# Patient Record
Sex: FEMALE | Race: WHITE | NOT HISPANIC OR LATINO | Employment: FULL TIME | URBAN - METROPOLITAN AREA
[De-identification: names, ages, dates, MRNs, and addresses within clinical notes are randomized per-mention and may not be internally consistent; named-entity substitution may affect disease eponyms.]

---

## 2023-02-07 PROBLEM — D75.1 ERYTHROCYTOSIS: Status: ACTIVE | Noted: 2023-02-07

## 2023-02-07 PROBLEM — F90.0 ADHD, PREDOMINANTLY INATTENTIVE TYPE: Status: ACTIVE | Noted: 2023-02-07

## 2023-02-07 PROBLEM — E55.9 VITAMIN D DEFICIENCY: Status: ACTIVE | Noted: 2023-02-07

## 2023-02-07 PROBLEM — G89.29 CHRONIC PAIN IN LEFT FOOT: Status: ACTIVE | Noted: 2023-02-07

## 2023-02-07 PROBLEM — M79.18 CHRONIC MUSCULOSKELETAL PAIN: Status: ACTIVE | Noted: 2023-02-07

## 2023-02-07 PROBLEM — Z97.5 PRESENCE OF IUD: Status: ACTIVE | Noted: 2023-02-07

## 2023-02-07 PROBLEM — K21.9 GERD (GASTROESOPHAGEAL REFLUX DISEASE): Status: ACTIVE | Noted: 2023-02-07

## 2023-02-07 PROBLEM — M79.672 PAIN IN BOTH FEET: Status: ACTIVE | Noted: 2023-02-07

## 2023-02-07 PROBLEM — N29 NEPHROCALCINOSIS: Status: ACTIVE | Noted: 2023-02-07

## 2023-02-07 PROBLEM — M54.2 CERVICAL PAIN: Status: ACTIVE | Noted: 2023-02-07

## 2023-02-07 PROBLEM — F41.9 ANXIETY: Status: ACTIVE | Noted: 2023-02-07

## 2023-02-07 PROBLEM — G43.909 MIGRAINES: Status: ACTIVE | Noted: 2023-02-07

## 2023-02-07 PROBLEM — K59.04 CHRONIC IDIOPATHIC CONSTIPATION: Status: ACTIVE | Noted: 2023-02-07

## 2023-02-07 PROBLEM — J30.9 ALLERGIC RHINITIS: Status: ACTIVE | Noted: 2023-02-07

## 2023-02-07 PROBLEM — R10.13 CHRONIC EPIGASTRIC PAIN: Status: ACTIVE | Noted: 2023-02-07

## 2023-02-07 PROBLEM — M79.2 NEURALGIA OF LEFT UPPER EXTREMITY: Status: ACTIVE | Noted: 2023-02-07

## 2023-02-07 PROBLEM — M25.572 CHRONIC PAIN OF LEFT ANKLE: Status: ACTIVE | Noted: 2023-02-07

## 2023-02-07 PROBLEM — R30.0 DYSURIA: Status: ACTIVE | Noted: 2023-02-07

## 2023-02-07 PROBLEM — M79.672 CHRONIC PAIN IN LEFT FOOT: Status: ACTIVE | Noted: 2023-02-07

## 2023-02-07 PROBLEM — F32.1 MODERATE MAJOR DEPRESSION (MULTI): Status: ACTIVE | Noted: 2023-02-07

## 2023-02-07 PROBLEM — M79.671 PAIN IN BOTH FEET: Status: ACTIVE | Noted: 2023-02-07

## 2023-02-07 PROBLEM — K58.9 IBS (IRRITABLE BOWEL SYNDROME): Status: ACTIVE | Noted: 2023-02-07

## 2023-02-07 PROBLEM — K76.0 FATTY LIVER: Status: ACTIVE | Noted: 2023-02-07

## 2023-02-07 PROBLEM — J06.9 URI (UPPER RESPIRATORY INFECTION): Status: ACTIVE | Noted: 2023-02-07

## 2023-02-07 PROBLEM — G57.92 DISORDER OF PERIPHERAL NERVE OF LEFT LOWER EXTREMITY: Status: ACTIVE | Noted: 2023-02-07

## 2023-02-07 PROBLEM — G89.29 CHRONIC PAIN OF LEFT ANKLE: Status: ACTIVE | Noted: 2023-02-07

## 2023-02-07 PROBLEM — G89.29 CHRONIC EPIGASTRIC PAIN: Status: ACTIVE | Noted: 2023-02-07

## 2023-02-07 PROBLEM — E83.59 NEPHROCALCINOSIS: Status: ACTIVE | Noted: 2023-02-07

## 2023-02-07 PROBLEM — G89.29 CHRONIC MUSCULOSKELETAL PAIN: Status: ACTIVE | Noted: 2023-02-07

## 2023-02-23 LAB
RPR MONITORING: NORMAL
VDRL TITER: ABNORMAL
VDRL: REACTIVE

## 2023-03-06 ENCOUNTER — TELEPHONE (OUTPATIENT)
Dept: PRIMARY CARE | Facility: CLINIC | Age: 48
End: 2023-03-06
Payer: COMMERCIAL

## 2023-03-06 NOTE — TELEPHONE ENCOUNTER
Pt left message that dr tristin witt extend her fmla leave. Since everything checks out ok with him, he ref her to a oral surgeon. She is supposed to rtw tomorrow and she is still in pain and unable to use a head set. Asking if you can extend her leave until she gets to see surgeon

## 2023-03-07 NOTE — TELEPHONE ENCOUNTER
Patient called back stated that appointment with oral surgeon had to be rescheduled to April 20. [facility] can get in anyplace sooner

## 2023-03-14 NOTE — TELEPHONE ENCOUNTER
Pt left message that sara darnell oral surgeon the soonest she could be seen is 3/21/23 and gloria needs ov notes and a letter of estimated RTW date.  Fax to [phone]

## 2023-04-02 PROBLEM — N75.0 BARTHOLIN'S GLAND CYST: Status: ACTIVE | Noted: 2023-04-02

## 2023-04-02 PROBLEM — J11.1 INFLUENZA: Status: ACTIVE | Noted: 2023-04-02

## 2023-04-02 PROBLEM — B19.10 HEPATITIS B: Status: ACTIVE | Noted: 2023-04-02

## 2023-04-02 PROBLEM — E66.3 OVERWEIGHT WITH BODY MASS INDEX (BMI) OF 29 TO 29.9 IN ADULT: Status: ACTIVE | Noted: 2023-04-02

## 2023-04-02 PROBLEM — H66.91 RIGHT OTITIS MEDIA: Status: ACTIVE | Noted: 2023-04-02

## 2023-04-02 PROBLEM — A53.9 SYPHILIS: Status: ACTIVE | Noted: 2023-04-02

## 2023-04-02 RX ORDER — CYCLOBENZAPRINE HCL 10 MG
10 TABLET ORAL 3 TIMES DAILY PRN
COMMUNITY
End: 2023-10-23 | Stop reason: SDUPTHER

## 2023-04-02 RX ORDER — ACETAMINOPHEN 500 MG
1 TABLET ORAL DAILY
COMMUNITY

## 2023-04-17 LAB
ALANINE AMINOTRANSFERASE (SGPT) (U/L) IN SER/PLAS: 19 U/L (ref 7–45)
ALBUMIN (G/DL) IN SER/PLAS: 4 G/DL (ref 3.4–5)
ALKALINE PHOSPHATASE (U/L) IN SER/PLAS: 63 U/L (ref 33–110)
ANION GAP IN SER/PLAS: 10 MMOL/L (ref 10–20)
ASPARTATE AMINOTRANSFERASE (SGOT) (U/L) IN SER/PLAS: 14 U/L (ref 9–39)
BILIRUBIN TOTAL (MG/DL) IN SER/PLAS: 0.3 MG/DL (ref 0–1.2)
CALCIUM (MG/DL) IN SER/PLAS: 8.6 MG/DL (ref 8.6–10.3)
CARBON DIOXIDE, TOTAL (MMOL/L) IN SER/PLAS: 25 MMOL/L (ref 21–32)
CHLORIDE (MMOL/L) IN SER/PLAS: 107 MMOL/L (ref 98–107)
CREATININE (MG/DL) IN SER/PLAS: 0.74 MG/DL (ref 0.5–1.05)
ERYTHROCYTE DISTRIBUTION WIDTH (RATIO) BY AUTOMATED COUNT: 14.5 % (ref 11.5–14.5)
ERYTHROCYTE MEAN CORPUSCULAR HEMOGLOBIN CONCENTRATION (G/DL) BY AUTOMATED: 32.5 G/DL (ref 32–36)
ERYTHROCYTE MEAN CORPUSCULAR VOLUME (FL) BY AUTOMATED COUNT: 92 FL (ref 80–100)
ERYTHROCYTES (10*6/UL) IN BLOOD BY AUTOMATED COUNT: 4.84 X10E12/L (ref 4–5.2)
GFR FEMALE: >90 ML/MIN/1.73M2
GLUCOSE (MG/DL) IN SER/PLAS: 88 MG/DL (ref 74–99)
HEMATOCRIT (%) IN BLOOD BY AUTOMATED COUNT: 44.3 % (ref 36–46)
HEMOGLOBIN (G/DL) IN BLOOD: 14.4 G/DL (ref 12–16)
HEPATITIS A VIRUS IGM AB PRESENCE IN SER/PLAS BY IMMUNOASSAY: NONREACTIVE
HEPATITIS B VIRUS CORE AB (PRESENCE) IN SER/PLAS BY IMM: NONREACTIVE
HEPATITIS B VIRUS CORE IGM AB PRESENCE IN SER/PLAS BY IMMUNOASSY: NONREACTIVE
HEPATITIS B VIRUS SURFACE AG PRESENCE IN SERUM: NONREACTIVE
HEPATITIS C VIRUS AB PRESENCE IN SERUM: NONREACTIVE
LEUKOCYTES (10*3/UL) IN BLOOD BY AUTOMATED COUNT: 7.3 X10E9/L (ref 4.4–11.3)
MISCELLANEUOUS TEST RESULT: NORMAL
NAME OF SENDOUT TEST: NORMAL
PLATELETS (10*3/UL) IN BLOOD AUTOMATED COUNT: 273 X10E9/L (ref 150–450)
POTASSIUM (MMOL/L) IN SER/PLAS: 4 MMOL/L (ref 3.5–5.3)
PROTEIN TOTAL: 7.3 G/DL (ref 6.4–8.2)
SODIUM (MMOL/L) IN SER/PLAS: 138 MMOL/L (ref 136–145)
UREA NITROGEN (MG/DL) IN SER/PLAS: 16 MG/DL (ref 6–23)

## 2023-04-20 LAB
HBV DNA PCR COMMENT: NORMAL
HBV DNA QUANT PCR IU/ML: NOT DETECTED IU/ML
HBV DNA QUANT PCR LOG: NORMAL LOG IU/ML

## 2023-04-21 LAB
RPR MONITORING: NORMAL
VDRL TITER: ABNORMAL
VDRL: ABNORMAL

## 2023-05-18 ENCOUNTER — TELEPHONE (OUTPATIENT)
Dept: PRIMARY CARE | Facility: CLINIC | Age: 48
End: 2023-05-18
Payer: COMMERCIAL
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
## 2023-06-12 NOTE — TELEPHONE ENCOUNTER
I cannot fill out her form without seeing her first. Please try to call again. If you cannot get a hold of her, please send a letter.

## 2023-06-12 NOTE — TELEPHONE ENCOUNTER
Patient's phone is still not connecting for me. I mailed her a letter asking to call if this is still needed. Also made copy of letter for chart

## 2023-09-22 ENCOUNTER — TELEPHONE (OUTPATIENT)
Dept: PRIMARY CARE | Facility: CLINIC | Age: 48
End: 2023-09-22
Payer: COMMERCIAL

## 2023-09-22 NOTE — TELEPHONE ENCOUNTER
I am not sure what she is referring to. The only thing I see is that in May she called asking for a letter to allow her to work from home and I stated she needed an appointment first. Looks like [place] tried to schedule the patient for an appointment but was unable to contact her, so she ended up sending her a letter to call the office and schedule. This was in [place].

## 2023-09-22 NOTE — TELEPHONE ENCOUNTER
Patient was wanting to know if you could put a rush on her records for life insurance??    Also need refill on flexeril

## 2023-09-26 ENCOUNTER — TELEPHONE (OUTPATIENT)
Dept: PRIMARY CARE | Facility: CLINIC | Age: 48
End: 2023-09-26
Payer: COMMERCIAL

## 2023-09-26 NOTE — TELEPHONE ENCOUNTER
1) Please let her know that I can write a letter verifying that she has had the hepatitis B vaccines but that she will need to get a letter from infectious disease (Dr. Burroughs) clearing her from the syphillis standpoint.    2) Please write a letter stating that she is up to date on hepatitis B vaccines, having had them done 2/22/23, 4/13/23, and 9/6/23

## 2023-09-26 NOTE — TELEPHONE ENCOUNTER
I spoke to the patient about this and what she needs is a letter stating that she completed her Hep B series of vaccines and that testing for syphillis came back clear. She will need it for life insurance. I can write it if you need I am just not completely sure how

## 2023-09-26 NOTE — TELEPHONE ENCOUNTER
Sent email to patient per her request with letter regarding Hep B vaccines. She was advised to contact Dr Burroughs for syphilis letter

## 2023-10-23 DIAGNOSIS — M79.18 CHRONIC MUSCULOSKELETAL PAIN: Primary | ICD-10-CM

## 2023-10-23 DIAGNOSIS — G89.29 CHRONIC MUSCULOSKELETAL PAIN: Primary | ICD-10-CM

## 2023-10-23 RX ORDER — CYCLOBENZAPRINE HCL 10 MG
10 TABLET ORAL 3 TIMES DAILY PRN
Qty: 90 TABLET | Refills: 1 | Status: SHIPPED | OUTPATIENT
Start: 2023-10-23 | End: 2024-01-21

## 2023-11-02 DIAGNOSIS — M79.18 CHRONIC MUSCULOSKELETAL PAIN: ICD-10-CM

## 2023-11-02 DIAGNOSIS — G89.29 CHRONIC MUSCULOSKELETAL PAIN: ICD-10-CM

## 2023-11-02 RX ORDER — CYCLOBENZAPRINE HCL 10 MG
10 TABLET ORAL 3 TIMES DAILY PRN
Qty: 90 TABLET | Refills: 1 | OUTPATIENT
Start: 2023-11-02 | End: 2024-01-31

## 2024-03-05 ENCOUNTER — TELEPHONE (OUTPATIENT)
Dept: PRIMARY CARE | Facility: CLINIC | Age: 49
End: 2024-03-05
Payer: MEDICARE

## 2024-03-05 DIAGNOSIS — G89.29 CHRONIC MUSCULOSKELETAL PAIN: ICD-10-CM

## 2024-03-05 DIAGNOSIS — M79.18 CHRONIC MUSCULOSKELETAL PAIN: ICD-10-CM

## 2024-03-05 RX ORDER — CYCLOBENZAPRINE HCL 10 MG
10 TABLET ORAL 3 TIMES DAILY PRN
Qty: 90 TABLET | Refills: 1 | Status: CANCELLED | OUTPATIENT
Start: 2024-03-05 | End: 2024-06-03

## 2024-06-11 ENCOUNTER — APPOINTMENT (OUTPATIENT)
Dept: PRIMARY CARE | Facility: CLINIC | Age: 49
End: 2024-06-11
Payer: MEDICARE

## 2024-06-11 ENCOUNTER — TELEPHONE (OUTPATIENT)
Dept: PRIMARY CARE | Facility: CLINIC | Age: 49
End: 2024-06-11

## 2024-06-11 DIAGNOSIS — G89.29 CHRONIC MUSCULOSKELETAL PAIN: ICD-10-CM

## 2024-06-11 DIAGNOSIS — M79.18 CHRONIC MUSCULOSKELETAL PAIN: ICD-10-CM

## 2024-06-11 DIAGNOSIS — B07.9 WARTS OF FOOT: Primary | ICD-10-CM

## 2024-06-11 RX ORDER — CYCLOBENZAPRINE HCL 10 MG
10 TABLET ORAL 3 TIMES DAILY PRN
Qty: 90 TABLET | Refills: 1 | Status: SHIPPED | OUTPATIENT
Start: 2024-06-11 | End: 2024-09-09

## 2024-06-11 NOTE — TELEPHONE ENCOUNTER
Patient had to reschedule appt, but wanted to know if she can get refill on muscle relaxer and wanted podiatry referral due to warts on feet

## 2024-09-03 ENCOUNTER — APPOINTMENT (OUTPATIENT)
Dept: PODIATRY | Facility: CLINIC | Age: 49
End: 2024-09-03
Payer: MEDICARE

## 2024-09-17 ENCOUNTER — APPOINTMENT (OUTPATIENT)
Dept: PRIMARY CARE | Facility: CLINIC | Age: 49
End: 2024-09-17
Payer: MEDICARE

## 2025-01-16 ENCOUNTER — OFFICE VISIT (OUTPATIENT)
Dept: PRIMARY CARE | Facility: CLINIC | Age: 50
End: 2025-01-16
Payer: MEDICAID

## 2025-01-16 VITALS
RESPIRATION RATE: 16 BRPM | HEART RATE: 66 BPM | WEIGHT: 191 LBS | SYSTOLIC BLOOD PRESSURE: 114 MMHG | BODY MASS INDEX: 31.82 KG/M2 | DIASTOLIC BLOOD PRESSURE: 77 MMHG | HEIGHT: 65 IN | OXYGEN SATURATION: 98 %

## 2025-01-16 DIAGNOSIS — G89.29 CHRONIC PAIN IN LEFT FOOT: ICD-10-CM

## 2025-01-16 DIAGNOSIS — R20.0 NUMBNESS AND TINGLING OF BOTH FEET: ICD-10-CM

## 2025-01-16 DIAGNOSIS — R20.0 NUMBNESS OF FINGERS OF BOTH HANDS: ICD-10-CM

## 2025-01-16 DIAGNOSIS — F90.0 ADHD, PREDOMINANTLY INATTENTIVE TYPE: ICD-10-CM

## 2025-01-16 DIAGNOSIS — B19.10 HEPATITIS B INFECTION WITHOUT DELTA AGENT WITHOUT HEPATIC COMA, UNSPECIFIED CHRONICITY: ICD-10-CM

## 2025-01-16 DIAGNOSIS — E66.811 CLASS 1 OBESITY DUE TO EXCESS CALORIES WITHOUT SERIOUS COMORBIDITY WITH BODY MASS INDEX (BMI) OF 31.0 TO 31.9 IN ADULT: ICD-10-CM

## 2025-01-16 DIAGNOSIS — A53.9 SYPHILIS: ICD-10-CM

## 2025-01-16 DIAGNOSIS — M79.18 CHRONIC MUSCULOSKELETAL PAIN: ICD-10-CM

## 2025-01-16 DIAGNOSIS — G89.29 CHRONIC MUSCULOSKELETAL PAIN: ICD-10-CM

## 2025-01-16 DIAGNOSIS — M79.672 CHRONIC PAIN IN LEFT FOOT: ICD-10-CM

## 2025-01-16 DIAGNOSIS — K76.0 FATTY LIVER: ICD-10-CM

## 2025-01-16 DIAGNOSIS — G89.29 CHRONIC PAIN OF LEFT ANKLE: ICD-10-CM

## 2025-01-16 DIAGNOSIS — M25.572 CHRONIC PAIN OF LEFT ANKLE: ICD-10-CM

## 2025-01-16 DIAGNOSIS — R20.2 NUMBNESS AND TINGLING OF BOTH FEET: ICD-10-CM

## 2025-01-16 DIAGNOSIS — Z13.220 LIPID SCREENING: ICD-10-CM

## 2025-01-16 DIAGNOSIS — R07.9 CHEST PAIN, UNSPECIFIED TYPE: Primary | ICD-10-CM

## 2025-01-16 DIAGNOSIS — Z82.49 FAMILY HISTORY OF HEART DISEASE: ICD-10-CM

## 2025-01-16 DIAGNOSIS — E66.09 CLASS 1 OBESITY DUE TO EXCESS CALORIES WITHOUT SERIOUS COMORBIDITY WITH BODY MASS INDEX (BMI) OF 31.0 TO 31.9 IN ADULT: ICD-10-CM

## 2025-01-16 PROBLEM — B07.9 WARTS OF FOOT: Status: RESOLVED | Noted: 2024-06-11 | Resolved: 2025-01-16

## 2025-01-16 PROBLEM — N29 NEPHROCALCINOSIS: Status: RESOLVED | Noted: 2023-02-07 | Resolved: 2025-01-16

## 2025-01-16 PROBLEM — J06.9 URI (UPPER RESPIRATORY INFECTION): Status: RESOLVED | Noted: 2023-02-07 | Resolved: 2025-01-16

## 2025-01-16 PROBLEM — F32.1 MODERATE MAJOR DEPRESSION (MULTI): Status: RESOLVED | Noted: 2023-02-07 | Resolved: 2025-01-16

## 2025-01-16 PROBLEM — G57.92 DISORDER OF PERIPHERAL NERVE OF LEFT LOWER EXTREMITY: Status: RESOLVED | Noted: 2023-02-07 | Resolved: 2025-01-16

## 2025-01-16 PROBLEM — M79.2 NEURALGIA OF LEFT UPPER EXTREMITY: Status: RESOLVED | Noted: 2023-02-07 | Resolved: 2025-01-16

## 2025-01-16 PROBLEM — K21.9 GERD (GASTROESOPHAGEAL REFLUX DISEASE): Status: RESOLVED | Noted: 2023-02-07 | Resolved: 2025-01-16

## 2025-01-16 PROBLEM — E83.59 NEPHROCALCINOSIS: Status: RESOLVED | Noted: 2023-02-07 | Resolved: 2025-01-16

## 2025-01-16 PROBLEM — J11.1 INFLUENZA: Status: RESOLVED | Noted: 2023-04-02 | Resolved: 2025-01-16

## 2025-01-16 PROBLEM — R30.0 DYSURIA: Status: RESOLVED | Noted: 2023-02-07 | Resolved: 2025-01-16

## 2025-01-16 PROBLEM — Z97.5 PRESENCE OF IUD: Status: RESOLVED | Noted: 2023-02-07 | Resolved: 2025-01-16

## 2025-01-16 PROBLEM — N75.0 BARTHOLIN'S GLAND CYST: Status: RESOLVED | Noted: 2023-04-02 | Resolved: 2025-01-16

## 2025-01-16 PROBLEM — D75.1 ERYTHROCYTOSIS: Status: RESOLVED | Noted: 2023-02-07 | Resolved: 2025-01-16

## 2025-01-16 PROBLEM — M79.671 PAIN IN BOTH FEET: Status: RESOLVED | Noted: 2023-02-07 | Resolved: 2025-01-16

## 2025-01-16 PROBLEM — F41.9 ANXIETY: Status: RESOLVED | Noted: 2023-02-07 | Resolved: 2025-01-16

## 2025-01-16 PROBLEM — H66.91 RIGHT OTITIS MEDIA: Status: RESOLVED | Noted: 2023-04-02 | Resolved: 2025-01-16

## 2025-01-16 PROCEDURE — 93000 ELECTROCARDIOGRAM COMPLETE: CPT | Performed by: FAMILY MEDICINE

## 2025-01-16 PROCEDURE — 3008F BODY MASS INDEX DOCD: CPT | Performed by: FAMILY MEDICINE

## 2025-01-16 PROCEDURE — 99214 OFFICE O/P EST MOD 30 MIN: CPT | Performed by: FAMILY MEDICINE

## 2025-01-16 PROCEDURE — 1036F TOBACCO NON-USER: CPT | Performed by: FAMILY MEDICINE

## 2025-01-16 RX ORDER — CYCLOBENZAPRINE HCL 10 MG
10 TABLET ORAL 3 TIMES DAILY PRN
Qty: 90 TABLET | Refills: 5 | Status: SHIPPED | OUTPATIENT
Start: 2025-01-16 | End: 2025-07-15

## 2025-01-16 RX ORDER — DEXTROAMPHETAMINE SACCHARATE, AMPHETAMINE ASPARTATE MONOHYDRATE, DEXTROAMPHETAMINE SULFATE AND AMPHETAMINE SULFATE 5; 5; 5; 5 MG/1; MG/1; MG/1; MG/1
1 CAPSULE, EXTENDED RELEASE ORAL
COMMUNITY
Start: 2024-12-24 | End: 2025-01-16 | Stop reason: SDUPTHER

## 2025-01-16 RX ORDER — DEXTROAMPHETAMINE SACCHARATE, AMPHETAMINE ASPARTATE MONOHYDRATE, DEXTROAMPHETAMINE SULFATE AND AMPHETAMINE SULFATE 5; 5; 5; 5 MG/1; MG/1; MG/1; MG/1
20 CAPSULE, EXTENDED RELEASE ORAL
COMMUNITY
Start: 2025-01-16

## 2025-01-16 ASSESSMENT — ENCOUNTER SYMPTOMS
SORE THROAT: 0
FREQUENCY: 0
ABDOMINAL PAIN: 0
PALPITATIONS: 0
COUGH: 0
SINUS PRESSURE: 0
DIARRHEA: 0
POLYDIPSIA: 0
UNEXPECTED WEIGHT CHANGE: 0
HEADACHES: 0
VOMITING: 0
NERVOUS/ANXIOUS: 0
NUMBNESS: 1
LIGHT-HEADEDNESS: 0
CHILLS: 0
DIAPHORESIS: 0
ADENOPATHY: 0
SINUS PAIN: 0
CONSTIPATION: 0
FEVER: 0
CONFUSION: 0
WHEEZING: 0
DIZZINESS: 0
CHEST TIGHTNESS: 0
SHORTNESS OF BREATH: 0
DYSURIA: 0
HEMATURIA: 0
DYSPHORIC MOOD: 0
POLYPHAGIA: 0
NAUSEA: 0

## 2025-01-16 ASSESSMENT — PATIENT HEALTH QUESTIONNAIRE - PHQ9
2. FEELING DOWN, DEPRESSED OR HOPELESS: NOT AT ALL
1. LITTLE INTEREST OR PLEASURE IN DOING THINGS: NOT AT ALL
3. TROUBLE FALLING OR STAYING ASLEEP: NOT AT ALL
6. FEELING BAD ABOUT YOURSELF - OR THAT YOU ARE A FAILURE OR HAVE LET YOURSELF OR YOUR FAMILY DOWN: NOT AT ALL
5. POOR APPETITE OR OVEREATING: NOT AT ALL
SUM OF ALL RESPONSES TO PHQ QUESTIONS 1-9: 1
SUM OF ALL RESPONSES TO PHQ9 QUESTIONS 1 & 2: 0
4. FEELING TIRED OR HAVING LITTLE ENERGY: NOT AT ALL
10. IF YOU CHECKED OFF ANY PROBLEMS, HOW DIFFICULT HAVE THESE PROBLEMS MADE IT FOR YOU TO DO YOUR WORK, TAKE CARE OF THINGS AT HOME, OR GET ALONG WITH OTHER PEOPLE: NOT DIFFICULT AT ALL
9. THOUGHTS THAT YOU WOULD BE BETTER OFF DEAD, OR OF HURTING YOURSELF: NOT AT ALL
7. TROUBLE CONCENTRATING ON THINGS, SUCH AS READING THE NEWSPAPER OR WATCHING TELEVISION: SEVERAL DAYS
8. MOVING OR SPEAKING SO SLOWLY THAT OTHER PEOPLE COULD HAVE NOTICED. OR THE OPPOSITE, BEING SO FIGETY OR RESTLESS THAT YOU HAVE BEEN MOVING AROUND A LOT MORE THAN USUAL: NOT AT ALL

## 2025-01-16 ASSESSMENT — ANXIETY QUESTIONNAIRES
GAD7 TOTAL SCORE: 0
IF YOU CHECKED OFF ANY PROBLEMS ON THIS QUESTIONNAIRE, HOW DIFFICULT HAVE THESE PROBLEMS MADE IT FOR YOU TO DO YOUR WORK, TAKE CARE OF THINGS AT HOME, OR GET ALONG WITH OTHER PEOPLE: NOT DIFFICULT AT ALL
7. FEELING AFRAID AS IF SOMETHING AWFUL MIGHT HAPPEN: NOT AT ALL
5. BEING SO RESTLESS THAT IT IS HARD TO SIT STILL: NOT AT ALL
4. TROUBLE RELAXING: NOT AT ALL
2. NOT BEING ABLE TO STOP OR CONTROL WORRYING: NOT AT ALL
6. BECOMING EASILY ANNOYED OR IRRITABLE: NOT AT ALL
3. WORRYING TOO MUCH ABOUT DIFFERENT THINGS: NOT AT ALL
1. FEELING NERVOUS, ANXIOUS, OR ON EDGE: NOT AT ALL
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
## 2025-01-16 NOTE — PATIENT INSTRUCTIONS
Michelle Christianson ,    Thank you for coming in today. We at [facility] appreciate your trust in our care. If you have any questions or concerns about the care you received today, please do not hesitate to contact us at [phone].    The following instructions were discussed today:    - get labs. For blood work: Nothing to eat or drink for at least 10 hours prior. Okay for water or black coffee.   - refer to cardiology

## 2025-01-16 NOTE — ASSESSMENT & PLAN NOTE
- completed treatment with infectious disease. Last saw them April 2023 and was told to follow up as needed

## 2025-01-22 ENCOUNTER — OFFICE VISIT (OUTPATIENT)
Dept: CARDIOLOGY | Facility: HOSPITAL | Age: 50
End: 2025-01-22
Payer: MEDICAID

## 2025-01-22 ENCOUNTER — TELEPHONE (OUTPATIENT)
Dept: PRIMARY CARE | Facility: CLINIC | Age: 50
End: 2025-01-22

## 2025-01-22 ENCOUNTER — LAB (OUTPATIENT)
Dept: LAB | Facility: LAB | Age: 50
End: 2025-01-22
Payer: MEDICAID

## 2025-01-22 VITALS
HEIGHT: 65 IN | WEIGHT: 197 LBS | HEART RATE: 80 BPM | BODY MASS INDEX: 32.82 KG/M2 | DIASTOLIC BLOOD PRESSURE: 88 MMHG | SYSTOLIC BLOOD PRESSURE: 120 MMHG

## 2025-01-22 DIAGNOSIS — E66.811 CLASS 1 OBESITY DUE TO EXCESS CALORIES WITHOUT SERIOUS COMORBIDITY WITH BODY MASS INDEX (BMI) OF 31.0 TO 31.9 IN ADULT: ICD-10-CM

## 2025-01-22 DIAGNOSIS — G89.29 CHRONIC PAIN IN LEFT FOOT: ICD-10-CM

## 2025-01-22 DIAGNOSIS — G89.29 CHRONIC MUSCULOSKELETAL PAIN: ICD-10-CM

## 2025-01-22 DIAGNOSIS — R07.89 ATYPICAL CHEST PAIN: ICD-10-CM

## 2025-01-22 DIAGNOSIS — R20.2 NUMBNESS AND TINGLING OF BOTH FEET: Primary | ICD-10-CM

## 2025-01-22 DIAGNOSIS — Z13.220 LIPID SCREENING: ICD-10-CM

## 2025-01-22 DIAGNOSIS — Z82.49 FAMILY HISTORY OF HYPERTROPHIC CARDIOMYOPATHY: Primary | ICD-10-CM

## 2025-01-22 DIAGNOSIS — K76.0 FATTY LIVER: ICD-10-CM

## 2025-01-22 DIAGNOSIS — R20.2 NUMBNESS AND TINGLING OF BOTH FEET: ICD-10-CM

## 2025-01-22 DIAGNOSIS — R03.0 ELEVATED BLOOD PRESSURE READING IN OFFICE WITHOUT DIAGNOSIS OF HYPERTENSION: ICD-10-CM

## 2025-01-22 DIAGNOSIS — A53.9 SYPHILIS: ICD-10-CM

## 2025-01-22 DIAGNOSIS — R20.0 NUMBNESS AND TINGLING OF BOTH FEET: ICD-10-CM

## 2025-01-22 DIAGNOSIS — R07.9 CHEST PAIN, UNSPECIFIED TYPE: ICD-10-CM

## 2025-01-22 DIAGNOSIS — M79.18 CHRONIC MUSCULOSKELETAL PAIN: ICD-10-CM

## 2025-01-22 DIAGNOSIS — G89.29 CHRONIC PAIN OF LEFT ANKLE: ICD-10-CM

## 2025-01-22 DIAGNOSIS — M25.572 CHRONIC PAIN OF LEFT ANKLE: ICD-10-CM

## 2025-01-22 DIAGNOSIS — R20.0 NUMBNESS OF FINGERS OF BOTH HANDS: ICD-10-CM

## 2025-01-22 DIAGNOSIS — Z82.49 FAMILY HISTORY OF HEART DISEASE: ICD-10-CM

## 2025-01-22 DIAGNOSIS — M79.672 CHRONIC PAIN IN LEFT FOOT: ICD-10-CM

## 2025-01-22 DIAGNOSIS — B19.10 HEPATITIS B INFECTION WITHOUT DELTA AGENT WITHOUT HEPATIC COMA, UNSPECIFIED CHRONICITY: ICD-10-CM

## 2025-01-22 DIAGNOSIS — R20.0 NUMBNESS AND TINGLING OF BOTH FEET: Primary | ICD-10-CM

## 2025-01-22 DIAGNOSIS — E66.09 CLASS 1 OBESITY DUE TO EXCESS CALORIES WITHOUT SERIOUS COMORBIDITY WITH BODY MASS INDEX (BMI) OF 31.0 TO 31.9 IN ADULT: ICD-10-CM

## 2025-01-22 LAB
25(OH)D3 SERPL-MCNC: 16 NG/ML (ref 30–100)
ALBUMIN SERPL BCP-MCNC: 4.1 G/DL (ref 3.4–5)
ALP SERPL-CCNC: 84 U/L (ref 33–110)
ALT SERPL W P-5'-P-CCNC: 21 U/L (ref 7–45)
ANION GAP SERPL CALC-SCNC: 10 MMOL/L (ref 10–20)
AST SERPL W P-5'-P-CCNC: 19 U/L (ref 9–39)
BASOPHILS # BLD AUTO: 0.04 X10*3/UL (ref 0–0.1)
BASOPHILS NFR BLD AUTO: 0.7 %
BILIRUB SERPL-MCNC: 0.3 MG/DL (ref 0–1.2)
BUN SERPL-MCNC: 18 MG/DL (ref 6–23)
CALCIUM SERPL-MCNC: 9.3 MG/DL (ref 8.6–10.3)
CHLORIDE SERPL-SCNC: 106 MMOL/L (ref 98–107)
CHOLEST SERPL-MCNC: 135 MG/DL (ref 0–199)
CHOLESTEROL/HDL RATIO: 2.3
CO2 SERPL-SCNC: 28 MMOL/L (ref 21–32)
CREAT SERPL-MCNC: 0.88 MG/DL (ref 0.5–1.05)
EGFRCR SERPLBLD CKD-EPI 2021: 82 ML/MIN/1.73M*2
EOSINOPHIL # BLD AUTO: 0.23 X10*3/UL (ref 0–0.7)
EOSINOPHIL NFR BLD AUTO: 4.2 %
ERYTHROCYTE [DISTWIDTH] IN BLOOD BY AUTOMATED COUNT: 14.2 % (ref 11.5–14.5)
GLUCOSE SERPL-MCNC: 79 MG/DL (ref 74–99)
HCT VFR BLD AUTO: 44.4 % (ref 36–46)
HDLC SERPL-MCNC: 57.9 MG/DL
HGB BLD-MCNC: 13.9 G/DL (ref 12–16)
IMM GRANULOCYTES # BLD AUTO: 0.01 X10*3/UL (ref 0–0.7)
IMM GRANULOCYTES NFR BLD AUTO: 0.2 % (ref 0–0.9)
LDLC SERPL CALC-MCNC: 66 MG/DL
LYMPHOCYTES # BLD AUTO: 1.77 X10*3/UL (ref 1.2–4.8)
LYMPHOCYTES NFR BLD AUTO: 32.7 %
MCH RBC QN AUTO: 29.1 PG (ref 26–34)
MCHC RBC AUTO-ENTMCNC: 31.3 G/DL (ref 32–36)
MCV RBC AUTO: 93 FL (ref 80–100)
MONOCYTES # BLD AUTO: 0.42 X10*3/UL (ref 0.1–1)
MONOCYTES NFR BLD AUTO: 7.7 %
NEUTROPHILS # BLD AUTO: 2.95 X10*3/UL (ref 1.2–7.7)
NEUTROPHILS NFR BLD AUTO: 54.5 %
NON HDL CHOLESTEROL: 77 MG/DL (ref 0–149)
NRBC BLD-RTO: 0 /100 WBCS (ref 0–0)
PLATELET # BLD AUTO: 227 X10*3/UL (ref 150–450)
POTASSIUM SERPL-SCNC: 4.3 MMOL/L (ref 3.5–5.3)
PROT SERPL-MCNC: 6.8 G/DL (ref 6.4–8.2)
RBC # BLD AUTO: 4.78 X10*6/UL (ref 4–5.2)
SODIUM SERPL-SCNC: 140 MMOL/L (ref 136–145)
TREPONEMA PALLIDUM IGG+IGM AB [PRESENCE] IN SERUM OR PLASMA BY IMMUNOASSAY: REACTIVE
TRIGL SERPL-MCNC: 55 MG/DL (ref 0–149)
TSH SERPL-ACNC: 1.66 MIU/L (ref 0.44–3.98)
VIT B12 SERPL-MCNC: 630 PG/ML (ref 211–911)
VLDL: 11 MG/DL (ref 0–40)
WBC # BLD AUTO: 5.4 X10*3/UL (ref 4.4–11.3)

## 2025-01-22 PROCEDURE — 85025 COMPLETE CBC W/AUTO DIFF WBC: CPT

## 2025-01-22 PROCEDURE — 1036F TOBACCO NON-USER: CPT | Performed by: INTERNAL MEDICINE

## 2025-01-22 PROCEDURE — 86780 TREPONEMA PALLIDUM: CPT

## 2025-01-22 PROCEDURE — 93005 ELECTROCARDIOGRAM TRACING: CPT | Performed by: INTERNAL MEDICINE

## 2025-01-22 PROCEDURE — 84443 ASSAY THYROID STIM HORMONE: CPT

## 2025-01-22 PROCEDURE — 87517 HEPATITIS B DNA QUANT: CPT

## 2025-01-22 PROCEDURE — 84425 ASSAY OF VITAMIN B-1: CPT

## 2025-01-22 PROCEDURE — 80053 COMPREHEN METABOLIC PANEL: CPT

## 2025-01-22 PROCEDURE — 3008F BODY MASS INDEX DOCD: CPT | Performed by: INTERNAL MEDICINE

## 2025-01-22 PROCEDURE — 84591 ASSAY OF NOS VITAMIN: CPT

## 2025-01-22 PROCEDURE — 82607 VITAMIN B-12: CPT

## 2025-01-22 PROCEDURE — 99214 OFFICE O/P EST MOD 30 MIN: CPT | Mod: 25 | Performed by: INTERNAL MEDICINE

## 2025-01-22 PROCEDURE — 86318 IA INFECTIOUS AGENT ANTIBODY: CPT

## 2025-01-22 PROCEDURE — 80061 LIPID PANEL: CPT

## 2025-01-22 PROCEDURE — 82306 VITAMIN D 25 HYDROXY: CPT

## 2025-01-22 PROCEDURE — 99204 OFFICE O/P NEW MOD 45 MIN: CPT | Performed by: INTERNAL MEDICINE

## 2025-01-22 PROCEDURE — 36415 COLL VENOUS BLD VENIPUNCTURE: CPT

## 2025-01-22 PROCEDURE — 93010 ELECTROCARDIOGRAM REPORT: CPT | Performed by: INTERNAL MEDICINE

## 2025-01-22 ASSESSMENT — ENCOUNTER SYMPTOMS
DEPRESSION: 0
LOSS OF SENSATION IN FEET: 0
OCCASIONAL FEELINGS OF UNSTEADINESS: 1

## 2025-01-22 NOTE — TELEPHONE ENCOUNTER
Rec'd call from  lab. Vitamin B2 and B6 were cancelled due to samples not being preserved correctly. I informed patient of this as well

## 2025-01-22 NOTE — PROGRESS NOTES
"Chief Complaint:   New Patient Visit (Chest pain tingle in hands and feet)     History Of Present Illness:    Michelle Christianson is a 49 y.o. female presenting for further evaluation due to recent discovery that her 14-year-old daughter has hypertrophic cardiomyopathy.  She states that no genetic testing was done on her.  It was recommended that she undergoes genetic testing instead.    She also has family history of coronary artery disease, father had multiple stents put in in his 60s.  He was also a smoker.    Patient has longstanding history of having chest pain that she describes as a pulled muscle lasting about a minute when emotionally stressed.  No symptoms with activities.  Also her heart rate goes up when she exercises.    She has been having some tingling in both hands and feet.    EKG appears normal.  Blood pressure is mildly elevated not known to be hypertensive.  She takes Adderall.  Last Recorded Vitals:  Vitals:    01/22/25 1446   BP: 120/88   BP Location: Left arm   Pulse: 80   Weight: 89.4 kg (197 lb)   Height: 1.651 m (5' 5\")       Past Medical History:  She has a past medical history of Acute upper respiratory infection, unspecified (05/06/2020), Allergy, unspecified, initial encounter (10/19/2021), Anesthesia of skin (03/21/2022), Anxiety (02/07/2023), Bartholin's gland cyst (04/02/2023), GERD (gastroesophageal reflux disease) (02/07/2023), Left upper quadrant pain (01/05/2021), Lower abdominal pain, unspecified (11/30/2020), Melena (02/07/2020), Moderate major depression (Multi) (02/07/2023), Other chest pain (02/17/2021), Personal history of other specified conditions (02/17/2021), Personal history of other specified conditions (02/17/2021), Personal history of other specified conditions (07/13/2021), Personal history of other specified conditions (07/13/2021), Personal history of other specified conditions (07/13/2021), Personal history of other specified conditions (03/21/2022), Personal " history of other specified conditions (10/19/2021), Personal history of other specified conditions (10/19/2021), Personal history of other specified conditions (10/19/2021), Personal history of other specified conditions (01/18/2021), Personal history of other specified conditions (01/18/2021), Personal history of urinary (tract) infections (04/15/2021), and Plantar fascial fibromatosis (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    Past Surgical History:  She has a past surgical history that includes Other surgical history (01/29/2020) and Other surgical history (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      Social History:  She reports that she quit smoking about 6 years ago. Her smoking use included cigarettes. She started smoking about 31 years ago. She has a 17.5 pack-year smoking history. She has never used smokeless tobacco. She reports that she does not currently use alcohol. She reports current drug use. Drug: Marijuana.    Family History:  Family History   Problem Relation Name Age of Onset    Other (Inflammatory disorder) Mother      Coronary artery disease Father      Hypertension Father          Allergies:  Penicillins, Bee venom protein (honey bee), and Latex    Outpatient Medications:  Current Outpatient Medications   Medication Instructions    amphetamine-dextroamphetamine XR (Adderall XR) 20 mg 24 hr capsule 20 mg, Daily (0630)    cyclobenzaprine (FLEXERIL) 10 mg, oral, 3 times daily PRN    NON FORMULARY Medical Marijuana (Not [facility] Prescribed) Do not fill       Physical Exam:  Physical Exam  Vitals reviewed.   Constitutional:       Appearance: Normal appearance.   Neck:      Vascular: No carotid bruit or JVD.   Cardiovascular:      Rate and Rhythm: Normal rate and regular rhythm.      Pulses: Normal pulses.      Heart sounds: Normal heart sounds, S1 normal and S2 normal.   Pulmonary:      Effort: Pulmonary effort is normal. No respiratory distress.      Breath sounds: No wheezing or rales.   Abdominal:      General: Abdomen is flat.      Palpations: Abdomen  "is soft.   Musculoskeletal:      Right lower leg: No edema.      Left lower leg: No edema.   Skin:     General: Skin is warm.   Neurological:      Mental Status: She is alert and oriented to person, place, and time. Mental status is at baseline.   Psychiatric:         Mood and Affect: Mood normal.         Behavior: Behavior normal.           Last Labs:  CBC -  Lab Results   Component Value Date    WBC 5.4 01/22/2025    HGB 13.9 01/22/2025    HCT 44.4 01/22/2025    MCV 93 01/22/2025     01/22/2025       CMP -  Lab Results   Component Value Date    CALCIUM 8.6 04/17/2023    PHOS 3.0 12/01/2021    PROT 7.3 04/17/2023    ALBUMIN 4.0 04/17/2023    AST 14 04/17/2023    ALT 19 04/17/2023    ALKPHOS 63 04/17/2023    BILITOT 0.3 04/17/2023       LIPID PANEL -   Lab Results   Component Value Date    CHOL 106 12/17/2022    TRIG 70 12/17/2022    HDL 38.4 (A) 12/17/2022    CHHDL 2.8 12/17/2022    LDLF 54 12/17/2022    VLDL 14 12/17/2022       RENAL FUNCTION PANEL -   Lab Results   Component Value Date    GLUCOSE 88 04/17/2023     04/17/2023    K 4.0 04/17/2023     04/17/2023    CO2 25 04/17/2023    ANIONGAP 10 04/17/2023    BUN 16 04/17/2023    CREATININE 0.74 04/17/2023    CALCIUM 8.6 04/17/2023    PHOS 3.0 12/01/2021    ALBUMIN 4.0 04/17/2023        Lab Results   Component Value Date    HGBA1C 5.4 12/01/2021       Last Cardiology Tests:  ECG:  ECG 12 Lead 01/16/2025      Echo:  No results found for this or any previous visit from the past 1095 days.      Ejection Fractions:  No results found for: \"EF\"    Cath:  No results found for this or any previous visit from the past 1095 days.      Stress Test:  No results found for this or any previous visit from the past 1095 days.      Cardiac Imaging:  No results found for this or any previous visit from the past 1095 days.          Assessment/Plan   1-elevated blood pressure without diagnosis of hypertension-her target blood pressure is less than 130/80.  Adderall " may be increasing blood pressure and needs close monitoring.  Advised to follow-up with our G[facility]ELE in 6 weeks with home blood pressure logs.    2-family history of hypertrophic cardiomyopathy-at patient's request will refer to cardiovascular genetics.  Will do an echocardiogram and stress test.    3-chest pain-risk factors of family history and personal history of elevated blood pressure.  Lipids are normal.  Atypical chest pain- will arrange for a exercise stress echocardiogram.  Patient had a normal stress echo in 2021 but did not have these symptoms at that time.      Moo Hinkle MD

## 2025-01-23 LAB
ATRIAL RATE: 80 BPM
HBV DNA SERPL NAA+PROBE-ACNC: NOT DETECTED [IU]/ML
HBV DNA SERPL NAA+PROBE-LOG IU: NORMAL {LOG_IU}/ML
P AXIS: 53 DEGREES
P OFFSET: 194 MS
P ONSET: 139 MS
PR INTERVAL: 156 MS
Q ONSET: 217 MS
QRS COUNT: 13 BEATS
QRS DURATION: 88 MS
QT INTERVAL: 364 MS
QTC CALCULATION(BAZETT): 419 MS
QTC FREDERICIA: 401 MS
R AXIS: 89 DEGREES
RPR SER QL: NONREACTIVE
T AXIS: 1 DEGREES
T OFFSET: 399 MS
VENTRICULAR RATE: 80 BPM

## 2025-01-24 LAB — T PALLIDUM AB SER QL AGGL: REACTIVE

## 2025-01-26 LAB — VIT B1 PYROPHOSHATE BLD-SCNC: 137 NMOL/L (ref 70–180)

## 2025-02-02 LAB
NIACIN SERPL-MCNC: NORMAL NG/ML
NICOTINAMIDE SERPL-MCNC: 40 NG/ML
NICOTINURATE SERPL-MCNC: NORMAL NG/ML

## 2025-02-03 ENCOUNTER — TELEPHONE (OUTPATIENT)
Dept: PRIMARY CARE | Facility: CLINIC | Age: 50
End: 2025-02-03
Payer: MEDICAID

## 2025-02-03 DIAGNOSIS — E55.9 VITAMIN D DEFICIENCY: Primary | ICD-10-CM

## 2025-02-03 RX ORDER — ACETAMINOPHEN 500 MG
2000 TABLET ORAL DAILY
Qty: 90 CAPSULE | Refills: 3 | Status: SHIPPED | OUTPATIENT
Start: 2025-02-03 | End: 2026-02-03

## 2025-02-03 NOTE — TELEPHONE ENCOUNTER
please let patient know labs show:    1) previous syphilis but no active infection. (She is aware of the previous syphilis infection)    2) Please let patient know that vitamin D level is low. I recommend taking vitamin D 2000 IU daily. Recheck vitamin D in 3 months. Order placed.     3) hepatitis B negative    4) Remainder of lab work normal.

## 2025-03-05 PROBLEM — M25.572 PAIN IN LEFT ANKLE AND JOINTS OF LEFT FOOT: Status: ACTIVE | Noted: 2025-03-05

## 2025-03-05 PROBLEM — G57.92 UNSPECIFIED MONONEUROPATHY OF LEFT LOWER LIMB: Status: ACTIVE | Noted: 2025-03-05

## 2025-03-05 PROBLEM — M54.2 CERVICALGIA: Status: ACTIVE | Noted: 2025-03-05

## 2025-03-06 ENCOUNTER — APPOINTMENT (OUTPATIENT)
Dept: CARDIOLOGY | Facility: HOSPITAL | Age: 50
End: 2025-03-06
Payer: MEDICAID

## 2025-03-06 ENCOUNTER — TELEPHONE (OUTPATIENT)
Dept: CARDIOLOGY | Facility: HOSPITAL | Age: 50
End: 2025-03-06
Payer: MEDICAID

## 2025-03-06 NOTE — TELEPHONE ENCOUNTER
LM for patient to call the office in regards to rescheduling the tests for an ECHO AND STRESS TEST and the follow up appt with the office.  Patient did not have testing done.

## 2025-03-12 ENCOUNTER — TELEPHONE (OUTPATIENT)
Dept: CARDIOLOGY | Facility: HOSPITAL | Age: 50
End: 2025-03-12
Payer: MEDICAID

## 2025-03-12 NOTE — TELEPHONE ENCOUNTER
Returned pt VM regarding rescheduling echo stress and echo - pt [facility] Vic [facility] for testing to be completed. Gave pt central scheduling phone # so pt may r/s these tests within the next few weeks. Pt states her Dad is on hospice and will need to plan around caring for him.

## 2025-03-17 ENCOUNTER — APPOINTMENT (OUTPATIENT)
Dept: CARDIOLOGY | Facility: HOSPITAL | Age: 50
End: 2025-03-17
Payer: MEDICAID

## 2025-04-03 DIAGNOSIS — E55.9 VITAMIN D DEFICIENCY: ICD-10-CM

## 2025-04-11 NOTE — PROGRESS NOTES
Chief Complaint/Reason for Visit:   Patient is coming in today as a 6 week Cardiovascular follow up.      History Of Present Illness:    Ms. Christianson is coming in today has a 6-week cardiovascular follow-up.  She was recently in to establish care due to recent discovery that her 14-year-old daughter had hypertrophic cardiomyopathy.  She was found to have elevated blood pressure without a diagnosis of hypertension along with an episode of atypical chest pain.  She was scheduled for an echocardiogram and stress echo but it appears they have not been completed.    Past Medical History:  She has a past medical history of Acute upper respiratory infection, unspecified (05/06/2020), Allergy, unspecified, initial encounter (10/19/2021), Anesthesia of skin (03/21/2022), Anxiety (02/07/2023), Bartholin's gland cyst (04/02/2023), GERD (gastroesophageal reflux disease) (02/07/2023), Left upper quadrant pain (01/05/2021), Lower abdominal pain, unspecified (11/30/2020), Melena (02/07/2020), Moderate major depression (Multi) (02/07/2023), Other chest pain (02/17/2021), Personal history of other specified conditions (02/17/2021), Personal history of other specified conditions (02/17/2021), Personal history of other specified conditions (07/13/2021), Personal history of other specified conditions (07/13/2021), Personal history of other specified conditions (07/13/2021), Personal history of other specified conditions (03/21/2022), Personal history of other specified conditions (10/19/2021), Personal history of other specified conditions (10/19/2021), Personal history of other specified conditions (10/19/2021), Personal history of other specified conditions (01/18/2021), Personal history of other specified conditions (01/18/2021), Personal history of urinary (tract) infections (04/15/2021), and Plantar fascial fibromatosis (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    Past Surgical History:  She has a past surgical history that includes Other surgical history  (01/29/2020) and Other surgical history (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      Social History:  She reports that she quit smoking about 6 years ago. Her smoking use included cigarettes. She started smoking about 31 years ago. She has a 17.5 pack-year smoking history. She has never used smokeless tobacco. She reports that she does not currently use alcohol. She reports current drug use. Drug: Marijuana.    Family History:  Family History   Problem Relation Name Age of Onset    Other (Inflammatory disorder) Mother      Coronary artery disease Father      Hypertension Father          Allergies:  Penicillins, Bee venom protein (honey bee), and Latex    Medications:  Current Outpatient Medications   Medication Instructions    amphetamine-dextroamphetamine XR (Adderall XR) 20 mg 24 hr capsule 20 mg, Daily (0630)    cholecalciferol (VITAMIN D3) 50 mcg, oral, Daily    cyclobenzaprine (FLEXERIL) 10 mg, oral, 3 times daily PRN    NON FORMULARY Medical Marijuana (Not [facility] Prescribed) Do not fill       Review of Systems:  [facility]     Vitals  Visit Vitals  Smoking Status Former        Physical Exam:  Physical Exam      Last Labs:  CBC -  Lab Results   Component Value Date    WBC 5.4 01/22/2025    HGB 13.9 01/22/2025    HCT 44.4 01/22/2025    MCV 93 01/22/2025     01/22/2025     Lab Results   Component Value Date    GLUCOSE 79 01/22/2025    CALCIUM 9.3 01/22/2025     01/22/2025    K 4.3 01/22/2025    CO2 28 01/22/2025     01/22/2025    BUN 18 01/22/2025    CREATININE 0.88 01/22/2025      CMP -  Lab Results   Component Value Date    CALCIUM 9.3 01/22/2025    PHOS 3.0 12/01/2021    PROT 6.8 01/22/2025    ALBUMIN 4.1 01/22/2025    AST 19 01/22/2025    ALT 21 01/22/2025    ALKPHOS 84 01/22/2025    BILITOT 0.3 01/22/2025       LIPID PANEL -   Lab Results   Component Value Date    CHOL 135 01/22/2025    TRIG 55 01/22/2025    HDL 57.9 01/22/2025    CHHDL 2.3 01/22/2025    LDLF 54 12/17/2022    VLDL 11 01/22/2025    NHDL 77 01/22/2025       Lab  Results   Component Value Date    HGBA1C 5.4 12/01/2021       Last Cardiology Tests:  ECG:    Echo:    Cath:    Stress Test:    {Lab/Diag/Rad Review:66384}    Assessment/Plan:  1-elevated blood pressure without diagnosis of hypertension-her target blood pressure is less than 130/80.  Adderall may be increasing blood pressure and needs close monitoring.  Advised to follow-up with our GABRIELE in 6 weeks with home blood pressure logs.     2-family history of hypertrophic cardiomyopathy-at patient's request will refer to cardiovascular genetics.  Will do an echocardiogram and stress test.     3-chest pain-risk factors of family history and personal history of elevated blood pressure.  Lipids are normal.  Atypical chest pain- will arrange for a exercise stress echocardiogram.  Patient had a normal stress echo in 2021 but did not have these symptoms at that time.    Edith Murphy, RUBEN-CNP

## 2025-04-14 ENCOUNTER — TELEPHONE (OUTPATIENT)
Dept: CARDIOLOGY | Facility: HOSPITAL | Age: 50
End: 2025-04-14
Payer: MEDICAID

## 2025-04-14 ENCOUNTER — APPOINTMENT (OUTPATIENT)
Dept: CARDIOLOGY | Facility: HOSPITAL | Age: 50
End: 2025-04-14
Payer: MEDICAID

## 2025-04-14 NOTE — TELEPHONE ENCOUNTER
I called and LM for patient to call the office to reschedule appt since her testing was not done.  She is also dealing with Family issues.

## 2025-05-05 ENCOUNTER — APPOINTMENT (OUTPATIENT)
Dept: PRIMARY CARE | Facility: CLINIC | Age: 50
End: 2025-05-05
Payer: MEDICAID

## 2025-06-25 ENCOUNTER — PATIENT OUTREACH (OUTPATIENT)
Dept: CARE COORDINATION | Facility: CLINIC | Age: 50
End: 2025-06-25
Payer: MEDICAID

## 2025-06-25 DIAGNOSIS — Z12.31 ENCOUNTER FOR SCREENING MAMMOGRAM FOR BREAST CANCER: ICD-10-CM

## 2025-07-01 ENCOUNTER — TELEPHONE (OUTPATIENT)
Dept: CARDIOLOGY | Facility: HOSPITAL | Age: 50
End: 2025-07-01
Payer: MEDICAID

## 2025-07-01 NOTE — TELEPHONE ENCOUNTER
Patient needs scheduled for echo & stress test before up coming appointment / attempted to schedule but patient was unavailable. Left message for patient to give our office a call back to get these tests scheduled.

## 2025-07-18 DIAGNOSIS — E78.5 HYPERLIPIDEMIA, UNSPECIFIED HYPERLIPIDEMIA TYPE: Primary | ICD-10-CM

## 2025-07-18 DIAGNOSIS — Z00.00 HEALTHCARE MAINTENANCE: ICD-10-CM

## 2025-07-23 ENCOUNTER — TELEPHONE (OUTPATIENT)
Dept: CARDIOLOGY | Facility: HOSPITAL | Age: 50
End: 2025-07-23
Payer: MEDICAID

## 2025-07-23 NOTE — TELEPHONE ENCOUNTER
Second outreach attempt to schedule Echo and stress test prior to appointment with provider. Will send to Consuelo with DR. Hinkle's team.

## 2025-07-29 ENCOUNTER — TELEPHONE (OUTPATIENT)
Dept: CARDIOLOGY | Facility: HOSPITAL | Age: 50
End: 2025-07-29
Payer: MEDICAID

## 2025-07-31 ENCOUNTER — APPOINTMENT (OUTPATIENT)
Dept: CARDIOLOGY | Facility: HOSPITAL | Age: 50
End: 2025-07-31
Payer: MEDICAID

## 2025-07-31 ENCOUNTER — TELEPHONE (OUTPATIENT)
Dept: CARDIOLOGY | Facility: HOSPITAL | Age: 50
End: 2025-07-31
Payer: MEDICAID

## 2025-07-31 RX ORDER — ONDANSETRON 4 MG/1
TABLET, ORALLY DISINTEGRATING ORAL
COMMUNITY
Start: 2025-07-13

## 2025-07-31 NOTE — TELEPHONE ENCOUNTER
Called patient and left vm stating that we need to reschedule appointment for today/ needs to be scheduled after upcoming testing

## 2025-08-13 ENCOUNTER — HOSPITAL ENCOUNTER (OUTPATIENT)
Dept: CARDIOLOGY | Facility: HOSPITAL | Age: 50
Discharge: HOME | End: 2025-08-13
Payer: MEDICAID

## 2025-08-13 ENCOUNTER — RESULTS FOLLOW-UP (OUTPATIENT)
Dept: CARDIOLOGY | Facility: HOSPITAL | Age: 50
End: 2025-08-13

## 2025-08-13 DIAGNOSIS — Z82.49 FAMILY HISTORY OF HEART DISEASE: ICD-10-CM

## 2025-08-13 DIAGNOSIS — Z82.49 FAMILY HISTORY OF HYPERTROPHIC CARDIOMYOPATHY: ICD-10-CM

## 2025-08-13 DIAGNOSIS — R07.89 ATYPICAL CHEST PAIN: ICD-10-CM

## 2025-08-13 DIAGNOSIS — R07.9 CHEST PAIN, UNSPECIFIED TYPE: ICD-10-CM

## 2025-08-13 PROCEDURE — 93306 TTE W/DOPPLER COMPLETE: CPT | Performed by: INTERNAL MEDICINE

## 2025-08-13 PROCEDURE — 93350 STRESS TTE ONLY: CPT | Performed by: INTERNAL MEDICINE

## 2025-08-13 PROCEDURE — 93016 CV STRESS TEST SUPVJ ONLY: CPT | Performed by: INTERNAL MEDICINE

## 2025-08-13 PROCEDURE — C8929 TTE W OR WO FOL WCON,DOPPLER: HCPCS

## 2025-08-13 PROCEDURE — 2500000004 HC RX 250 GENERAL PHARMACY W/ HCPCS (ALT 636 FOR OP/ED): Performed by: INTERNAL MEDICINE

## 2025-08-13 PROCEDURE — 93017 CV STRESS TEST TRACING ONLY: CPT

## 2025-08-13 PROCEDURE — 93018 CV STRESS TEST I&R ONLY: CPT | Performed by: INTERNAL MEDICINE

## 2025-08-13 RX ADMIN — PERFLUTREN 1.5 ML OF DILUTION: 6.52 INJECTION, SUSPENSION INTRAVENOUS at 10:21

## 2025-08-14 LAB
AORTIC VALVE MEAN GRADIENT: 5 MMHG
AORTIC VALVE PEAK VELOCITY: 1.36 M/S
AV PEAK GRADIENT: 7 MMHG
AVA (PEAK VEL): 2.67 CM2
AVA (VTI): 2.64 CM2
EJECTION FRACTION APICAL 4 CHAMBER: 59.7
EJECTION FRACTION: 54 %
LEFT ATRIUM VOLUME AREA LENGTH INDEX BSA: 26.8 ML/M2
LEFT VENTRICLE INTERNAL DIMENSION DIASTOLE: 4.6 CM (ref 3.5–6)
LEFT VENTRICULAR OUTFLOW TRACT DIAMETER: 1.91 CM
LV EJECTION FRACTION BIPLANE: 54 %
MITRAL VALVE E/A RATIO: 1.06
MITRAL VALVE E/E' RATIO: 5
RIGHT VENTRICLE PEAK SYSTOLIC PRESSURE: 3 MMHG
TRICUSPID ANNULAR PLANE SYSTOLIC EXCURSION: 1.7 CM

## 2025-10-09 ENCOUNTER — APPOINTMENT (OUTPATIENT)
Dept: CARDIOLOGY | Facility: HOSPITAL | Age: 50
End: 2025-10-09
Payer: MEDICAID